# Patient Record
Sex: FEMALE | Race: BLACK OR AFRICAN AMERICAN | NOT HISPANIC OR LATINO | URBAN - METROPOLITAN AREA
[De-identification: names, ages, dates, MRNs, and addresses within clinical notes are randomized per-mention and may not be internally consistent; named-entity substitution may affect disease eponyms.]

---

## 2022-01-01 ENCOUNTER — INPATIENT (INPATIENT)
Facility: HOSPITAL | Age: 62
LOS: 0 days | End: 2022-11-01
Attending: INTERNAL MEDICINE | Admitting: INTERNAL MEDICINE

## 2022-01-01 VITALS
TEMPERATURE: 98 F | OXYGEN SATURATION: 95 % | RESPIRATION RATE: 20 BRPM | DIASTOLIC BLOOD PRESSURE: 69 MMHG | HEART RATE: 115 BPM | SYSTOLIC BLOOD PRESSURE: 95 MMHG

## 2022-01-01 VITALS
TEMPERATURE: 98 F | HEART RATE: 137 BPM | RESPIRATION RATE: 24 BRPM | SYSTOLIC BLOOD PRESSURE: 73 MMHG | WEIGHT: 119.93 LBS | DIASTOLIC BLOOD PRESSURE: 44 MMHG

## 2022-01-01 DIAGNOSIS — R00.0 TACHYCARDIA, UNSPECIFIED: ICD-10-CM

## 2022-01-01 DIAGNOSIS — R52 PAIN, UNSPECIFIED: ICD-10-CM

## 2022-01-01 DIAGNOSIS — Z51.5 ENCOUNTER FOR PALLIATIVE CARE: ICD-10-CM

## 2022-01-01 DIAGNOSIS — E43 UNSPECIFIED SEVERE PROTEIN-CALORIE MALNUTRITION: ICD-10-CM

## 2022-01-01 DIAGNOSIS — C34.90 MALIGNANT NEOPLASM OF UNSPECIFIED PART OF UNSPECIFIED BRONCHUS OR LUNG: ICD-10-CM

## 2022-01-01 LAB
HCV AB S/CO SERPL IA: 12.77 S/CO — HIGH (ref 0–0.99)
HCV AB SERPL-IMP: REACTIVE
HCV RNA FLD QL NAA+PROBE: SIGNIFICANT CHANGE UP
HCV RNA SPEC QL PROBE+SIG AMP: DETECTED

## 2022-01-01 PROCEDURE — 99223 1ST HOSP IP/OBS HIGH 75: CPT

## 2022-01-01 PROCEDURE — 99284 EMERGENCY DEPT VISIT MOD MDM: CPT

## 2022-01-01 PROCEDURE — 99238 HOSP IP/OBS DSCHRG MGMT 30/<: CPT

## 2022-01-01 PROCEDURE — 99358 PROLONG SERVICE W/O CONTACT: CPT

## 2022-01-01 RX ORDER — HYDROMORPHONE HYDROCHLORIDE 2 MG/ML
1 INJECTION INTRAMUSCULAR; INTRAVENOUS; SUBCUTANEOUS
Refills: 0 | Status: DISCONTINUED | OUTPATIENT
Start: 2022-01-01 | End: 2022-01-01

## 2022-01-01 RX ORDER — SODIUM CHLORIDE 9 MG/ML
1000 INJECTION, SOLUTION INTRAVENOUS
Refills: 0 | Status: DISCONTINUED | OUTPATIENT
Start: 2022-01-01 | End: 2022-01-01

## 2022-01-01 RX ORDER — HYDROMORPHONE HYDROCHLORIDE 2 MG/ML
2 INJECTION INTRAMUSCULAR; INTRAVENOUS; SUBCUTANEOUS ONCE
Refills: 0 | Status: DISCONTINUED | OUTPATIENT
Start: 2022-01-01 | End: 2022-01-01

## 2022-01-01 RX ORDER — ACETAMINOPHEN 500 MG
650 TABLET ORAL EVERY 8 HOURS
Refills: 0 | Status: DISCONTINUED | OUTPATIENT
Start: 2022-01-01 | End: 2022-01-01

## 2022-01-01 RX ORDER — ONDANSETRON 8 MG/1
4 TABLET, FILM COATED ORAL ONCE
Refills: 0 | Status: COMPLETED | OUTPATIENT
Start: 2022-01-01 | End: 2022-01-01

## 2022-01-01 RX ORDER — HYDROMORPHONE HYDROCHLORIDE 2 MG/ML
1.5 INJECTION INTRAMUSCULAR; INTRAVENOUS; SUBCUTANEOUS
Refills: 0 | Status: DISCONTINUED | OUTPATIENT
Start: 2022-01-01 | End: 2022-01-01

## 2022-01-01 RX ORDER — ROBINUL 0.2 MG/ML
0.4 INJECTION INTRAMUSCULAR; INTRAVENOUS EVERY 6 HOURS
Refills: 0 | Status: DISCONTINUED | OUTPATIENT
Start: 2022-01-01 | End: 2022-01-01

## 2022-01-01 RX ORDER — HYDROMORPHONE HYDROCHLORIDE 2 MG/ML
3.5 INJECTION INTRAMUSCULAR; INTRAVENOUS; SUBCUTANEOUS
Qty: 100 | Refills: 0 | Status: DISCONTINUED | OUTPATIENT
Start: 2022-01-01 | End: 2022-01-01

## 2022-01-01 RX ORDER — OXYCODONE HYDROCHLORIDE 5 MG/1
10 TABLET ORAL EVERY 8 HOURS
Refills: 0 | Status: DISCONTINUED | OUTPATIENT
Start: 2022-01-01 | End: 2022-01-01

## 2022-01-01 RX ORDER — SENNA PLUS 8.6 MG/1
2 TABLET ORAL AT BEDTIME
Refills: 0 | Status: DISCONTINUED | OUTPATIENT
Start: 2022-01-01 | End: 2022-01-01

## 2022-01-01 RX ORDER — LANOLIN ALCOHOL/MO/W.PET/CERES
3 CREAM (GRAM) TOPICAL AT BEDTIME
Refills: 0 | Status: DISCONTINUED | OUTPATIENT
Start: 2022-01-01 | End: 2022-01-01

## 2022-01-01 RX ORDER — HYDROMORPHONE HYDROCHLORIDE 2 MG/ML
5 INJECTION INTRAMUSCULAR; INTRAVENOUS; SUBCUTANEOUS
Refills: 0 | Status: DISCONTINUED | OUTPATIENT
Start: 2022-01-01 | End: 2022-01-01

## 2022-01-01 RX ADMIN — HYDROMORPHONE HYDROCHLORIDE 2 MILLIGRAM(S): 2 INJECTION INTRAMUSCULAR; INTRAVENOUS; SUBCUTANEOUS at 14:21

## 2022-01-01 RX ADMIN — SENNA PLUS 2 TABLET(S): 8.6 TABLET ORAL at 22:07

## 2022-01-01 RX ADMIN — SODIUM CHLORIDE 75 MILLILITER(S): 9 INJECTION, SOLUTION INTRAVENOUS at 06:49

## 2022-01-01 RX ADMIN — ONDANSETRON 4 MILLIGRAM(S): 8 TABLET, FILM COATED ORAL at 03:51

## 2022-01-01 RX ADMIN — SODIUM CHLORIDE 75 MILLILITER(S): 9 INJECTION, SOLUTION INTRAVENOUS at 18:08

## 2022-01-01 RX ADMIN — Medication 650 MILLIGRAM(S): at 22:07

## 2022-01-01 RX ADMIN — HYDROMORPHONE HYDROCHLORIDE 3.5 MG/HR: 2 INJECTION INTRAMUSCULAR; INTRAVENOUS; SUBCUTANEOUS at 17:24

## 2022-10-31 NOTE — H&P ADULT - PROBLEM SELECTOR PLAN 2
metastatic lung cancer on home hospice, come in with intractable pain metastatic lung cancer on home hospice, come in with intractable pain  Palliative consulted  IV dilaudid drip 3.5mg/hr, IV dilaudid 5mg q1hr prn IV push for severe pain per palliative team recommendation

## 2022-10-31 NOTE — H&P ADULT - ASSESSMENT
62 years old female with h/o metastatic lung cancer on home hospice present to ED with complain of generalized weakness and generalized body pain, no controlled with IV morphine drip at home  Tachycardic to 120-130s in ED. Reported severe pain, mild improvement with IV dilaudid 2mg.

## 2022-10-31 NOTE — ED ADULT NURSE REASSESSMENT NOTE - NS ED NURSE REASSESS COMMENT FT1
Family enquired about giving more pain medications and asking to use morphine pump. Admitting Dr notified, Dilaudid dose changed see new orders and family to hold morphine pump. Family enquired about giving more pain medications and asking to use morphine pump. Admitting Dr notified, Dilaudid dose changed see new orders and family to hold morphine pump. On assessment, patient noted with wound to coccyx, redness and excoriation to area. Small dark area to bony prominence in mid area of back. Patient repositioned for pressure relieve and comfort.

## 2022-10-31 NOTE — PHARMACOTHERAPY INTERVENTION NOTE - COMMENTS
MD called and wants to give dilaudid 5mg IV q 1h prn severe pain. MD consulted with pallative care and wants to continue order.

## 2022-10-31 NOTE — H&P ADULT - NSHPPHYSICALEXAM_GEN_ALL_CORE
CONSTITUTIONAL: Well developed, thin appearing, severe distress due to pain  EYES: PERRL, no scleral icterus  ENT: Mucosa moist, tongue normal.   NECK: Neck supple, trachea midline, non-tender  CARDIAC: Normal S1 and S2. Regular rate and rhythms. No murmurs. No Pedal edema. Peripheral pulses intact  LUNGS: Equal air entry both lungs. No rales, rhonchi, wheezing. Normal respiratory effort.   ABDOMEN: Tenderness +.  No hepatomegaly or splenomegaly. Bowel sound normal.   MUSCULOSKELETAL: Normocephalic, atraumatic. No significant deformity or joint abnormality  NEUROLOGICAL: No gross motor or sensory deficits  SKIN: no lesions or eruptions. Normal turgor  PSYCHIATRIC: A&O x 3, appropriate mood and affect

## 2022-10-31 NOTE — H&P ADULT - HISTORY OF PRESENT ILLNESS
62 years old female with h/o metastatic lung cancer on home hospice present to ED with complain of generalized weakness and generalized body pain, no controlled with IV morphine drip at home  Tachycardic to 120-130s in ED. Reported severe pain, mild improvement with IV dilaudid 2mg.     SH: patient too weak to answer  FH: patient too weak to answer

## 2022-10-31 NOTE — ED ADULT TRIAGE NOTE - CHIEF COMPLAINT QUOTE
Brought in by ambulance for generalized body pain and decreased appetite x 3 days. As per EMS, patient has a DNR, has stage 4 lung cancer, metastasized. EMS reported on patient was on morphine drip at home, has a midline on right arm. Patient is nonverbal in triage.

## 2022-10-31 NOTE — H&P ADULT - NSHPADDITIONALINFOADULT_GEN_ALL_CORE
DNR/DNI, comfort care only. Do not want labs. Ok with IV fluid  Inpatient hospice vs LTC with hospice as patient has 90 years old mother who cannot take care of her

## 2022-10-31 NOTE — H&P ADULT - PROBLEM SELECTOR PLAN 3
Likely due to pain  Pain control  Palliative consulted Likely due to pain  Pain control  Palliative consulted  Gentle IV hydration

## 2022-10-31 NOTE — H&P ADULT - PROBLEM SELECTOR PLAN 1
present with intractable pain present with intractable pain  Called [facility] hospice( [phone]). Talked with Dr Meier. Patient is on PCA dilaudid 2.5mg/hr basal and 4mg q1hr prn bolus, oxycodone 10mg prn, methadone 10mg q4hr prn  Discussed with Palliative team Dr Sullivan, unsure if patient is able to tolerate oral. Recommended to start IV dilaudid drip 3.5mg/hr and Dilaudid 5mg q1hr IV prn for breakthrough pain  Gentle IV D5 NS

## 2022-10-31 NOTE — ED PROVIDER NOTE - OBJECTIVE STATEMENT
62F hx of lung cancer, metastatic, beyond treatment pw pain. pt is comfort measures only. accompanied by brother but being cared for at home by elderly mother.

## 2022-10-31 NOTE — ED PROVIDER NOTE - PHYSICAL EXAMINATION
gen - frail, ill appearing, looks uncomfortable  skin - intact  cv - tachycardia  resp - tachypnea  gi - soft, nt  msk - cachectic  eyes - eomi  neuro - awake, appears to answer some yes or no questions, oriented to self.   id - afebrile

## 2022-11-01 NOTE — CONSULT NOTE ADULT - CONVERSATION DETAILS
Spoke with patient's brother Hair who expressed that he is primary decisionmaker, also has son who is involved in her care and aware of patient's condition. Patient was recently at [facility] inpatient hospice unit and dc'd to home hospice w [facility] on dilaudid PCA and other pain medications, however his elderly mother unable to care for patient. Patient has been declining, with increasing pain, not eating, becoming weaker. Their main goal is patient's comfort, pain control. Advised patient appears to be actively dying, prognosis could be anytime, but likely hours to days. He verbalized understanding. Advised patient is appropriate for inpatient hospice for management of symptoms, he is agreeable for referral.  services offered and accepted. Support provided. SW referral made.    Copy of [facility] on file for DNR/DNI, comfort measures.

## 2022-11-01 NOTE — ED ADULT NURSE REASSESSMENT NOTE - NS ED NURSE REASSESS COMMENT FT1
House provider paged and responded NP Jim called unit, provider informed that pt has passed and has no pulse pt is a DNR

## 2022-11-01 NOTE — ED ADULT NURSE REASSESSMENT NOTE - COMFORT CARE
po fluids offered/repositioned/warm blanket provided
meal provided/repositioned/warm blanket provided
plan of care explained/warm blanket provided
po fluids offered/repositioned

## 2022-11-01 NOTE — CONSULT NOTE ADULT - SUBJECTIVE AND OBJECTIVE BOX
Consult to: Discuss complex medical decision making related to goals of care       HPI:  62 years old female with h/o metastatic lung cancer on home hospice present to ED with complain of generalized weakness and generalized body pain, no controlled with IV morphine drip at home  Tachycardic to 120-130s in ED. Reported severe pain, mild improvement with IV dilaudid 2mg.     SH: patient too weak to answer  FH: patient too weak to answer (31 Oct 2022 16:19)    Interval history: pt min responsive, breathing mildly labored, appears to be actively dying. DNR/DNI in place.       PAST MEDICAL & SURGICAL HISTORY:      FAMILY HISTORY:   unable to obtain from patient due to poor mentation, family unable to give information, see H&P for history      SOCIAL HISTORY: has 1 son, siblings  [ ]  Uatsdin/Spirituality: Congregational  Substance hx:  [x ]   Tobacco hx:  [x ]   Alcohol hx: [ x]   Home Opioid hx:  dilaudid PCA, oxycodone IR, methadone oral solution, doses as below   I-Stop Reference No: [id number]  Living Situation: [ x]Home  [ ]Long term care  [ ]Rehab [ ]Other    Baseline ADLs (prior to admission): awake, nonambulatory    Review of systems:        Unable to obtain/Limited due to poor mental status    Allergies    No Known Allergies    Intolerances           MEDICATIONS  (STANDING):  acetaminophen     Tablet .. 650 milliGRAM(s) Oral every 8 hours  dextrose 5% + sodium chloride 0.9%. 1000 milliLiter(s) (75 mL/Hr) IV Continuous <Continuous>  HYDROmorphone Infusion 3.5 mG/Hr (3.5 mL/Hr) IV Continuous <Continuous>  senna 2 Tablet(s) Oral at bedtime    MEDICATIONS  (PRN):  aluminum hydroxide/magnesium hydroxide/simethicone Suspension 30 milliLiter(s) Oral every 4 hours PRN Dyspepsia  glycopyrrolate Injectable 0.4 milliGRAM(s) IV Push every 6 hours PRN secretions  HYDROmorphone  Injectable 5 milliGRAM(s) IV Push every 1 hour PRN Severe Pain (7 - 10)  melatonin 3 milliGRAM(s) Oral at bedtime PRN Insomnia  oxyCODONE    IR 10 milliGRAM(s) Oral every 8 hours PRN Moderate Pain (4 - 6)      PHYSICAL EXAM:  Vital Signs Last 24 Hrs  T(C): 36.4 (01 Nov 2022 04:01), Max: 36.8 (31 Oct 2022 20:01)  T(F): 97.6 (01 Nov 2022 04:01), Max: 98.3 (31 Oct 2022 20:01)  HR: 115 (01 Nov 2022 04:01) (115 - 137)  BP: 95/69 (01 Nov 2022 04:01) (73/44 - 95/69)  BP(mean): 70 (01 Nov 2022 02:06) (70 - 70)  RR: 20 (01 Nov 2022 04:01) (19 - 31)  SpO2: 95% (01 Nov 2022 04:01) (94% - 97%)    Parameters below as of 01 Nov 2022 04:01  Patient On (Oxygen Delivery Method): nasal cannula  O2 Flow (L/min): 2       Palliative Performance Scale/Karnofsky Score: 10  ECOG Performance:4    GENERAL: lethargic, ill appearing, on NC,  NAD  HEENT: Atraumatic, oropharynx clear, neck supple  CHEST/LUNG: mildly labored  HEART: Regular rate and rhythm    ABDOMEN: Soft, Nontender, Nondistended   MUSCULOSKELETAL:  No  edema,  bedbound  NERVOUS SYSTEM: lethargic, not following commands  SKIN: No rashes or lesions noted  Oral intake: npo     LABS:              RADIOLOGY & ADDITIONAL STUDIES:

## 2022-11-01 NOTE — ED ADULT NURSE NOTE - ED STAT RN HANDOFF DETAILS
Report given to ROBEL Nathan,pts history, current condition and reason for admission discussed, safety concerns addressed and reviewed, pt currently in stable condition, IV flushes for patency and site shows no signs or symptoms of infiltrate, dressing is clean dry and intact, pt is aware of plan of care. Pt education deemed successful at time of report after patient demonstrates successful teach back for proficiency.

## 2022-11-01 NOTE — CONSULT NOTE ADULT - PROBLEM SELECTOR RECOMMENDATION 2
on home hospice w [facility], recent admission to  inpatient hospice unit. Main goal is comfort, patient currently actively dying, family agreeable for IPU. DNR/DNI    glycopyrrolate prn secretions

## 2022-11-01 NOTE — CONSULT NOTE ADULT - PROBLEM SELECTOR RECOMMENDATION 3
Clinical evidence indicates that the patient has Severe protein calorie malnutrition/ 3rd degree    In context of   Chronic Illness (>1 month)    Energy/Food intake <50% of estimated energy requirement >5 days  Weight loss: Moderate - severe (lbs lost recently)  Body Fat loss: Severe   (Cachexia, temporal wasting, muscle atrophy)       Strength: weakened severe (bedbound)    Recommend:   pleasure feeds as tolerated - currently npo given poor mental status. Oral care only

## 2022-11-01 NOTE — CONSULT NOTE ADULT - PROBLEM SELECTOR RECOMMENDATION 4
Copy of [facility] on file for DNR/DNI, comfort measures. Pt actively dying, prognosis hours to days. Family in agreement for IPU for management of symptoms. SW referral made.

## 2022-11-01 NOTE — CONSULT NOTE ADULT - PROBLEM SELECTOR RECOMMENDATION 9
on dilaudid PCA 2.5 mg/hr with 4mg q1h prn for breakthrough, oxycodone IR prn and methadone 10mg q4h prn breakthrough pain- confirmed w VNS, ISTOP reviewed.   D/w primary team on admission,  started on dilaudid infusion @3.5 mg /hr w 5 mg IVP q1h prn for breakthrough pain or dyspnea

## 2022-11-01 NOTE — DISCHARGE NOTE FOR THE EXPIRED PATIENT - HOSPITAL COURSE
62 years old female with h/o metastatic lung cancer on home hospice present to ED with complain of generalized weakness and generalized body pain, no controlled with IV morphine drip at home  Called [facility] hospice( [phone]). Talked with Dr Meier. Patient is on PCA dilaudid 2.5mg/hr basal and 4mg q1hr prn bolus, oxycodone 10mg prn, methadone 10mg q4hr prn  Discussed with Palliative team Dr Sullivan, unsure if patient is able to tolerate oral. Recommended to start IV dilaudid drip 3.5mg/hr and Dilaudid 5mg q1hr IV prn for breakthrough pain    ·  Problem: Stage 4 lung cancer.   ·  Plan: metastatic lung cancer on home hospice, come in with intractable pain  Palliative consulted  IV dilaudid drip 3.5mg/hr, IV dilaudid 5mg q1hr prn IV push for severe pain per palliative team recommendation.    Additional Information: DNR/DNI, comfort care only. Do not want labs. Ok with IV fluid  Inpatient hospice vs LTC with hospice as patient has 90 years old mother who cannot take care of her    Was asked by RN to pronounce pt. Pt seen and examined at bedside. Absent heart sounds, absent breath sounds. No pupillary reflex noted. Absent radial, carotid and femoral pulses. Pt pronounced  at 11:18am. Daughter Myrtle Mendoza notified at bedside. Dr. Rowan notified.  62 years old female with h/o metastatic lung cancer on home hospice present to ED with complain of generalized weakness and generalized body pain, no controlled with IV morphine drip at home  Called [facility] hospice( [phone]). Talked with Dr Meier. Patient is on PCA dilaudid 2.5mg/hr basal and 4mg q1hr prn bolus, oxycodone 10mg prn, methadone 10mg q4hr prn  Discussed with Palliative team Dr Sullivan, unsure if patient is able to tolerate oral. Recommended to start IV dilaudid drip 3.5mg/hr and Dilaudid 5mg q1hr IV prn for breakthrough pain    ·  Problem: Stage 4 lung cancer.   ·  Plan: metastatic lung cancer on home hospice, come in with intractable pain  Palliative consulted  IV dilaudid drip 3.5mg/hr, IV dilaudid 5mg q1hr prn IV push for severe pain per palliative team recommendation.    Additional Information: DNR/DNI, comfort care only. Do not want labs. Ok with IV fluid  Inpatient hospice vs LTC with hospice as patient has 90 years old mother who cannot take care of her    Was asked by RN to pronounce pt. Pt seen and examined at bedside. Absent heart sounds, absent breath sounds. No pupillary reflex noted. Absent radial, carotid and femoral pulses. Pt pronounced  at 11:18am. Sister, Myrtle Mendoza notified at bedside. Dr. Rowan notified.

## 2022-11-07 DIAGNOSIS — C34.90 MALIGNANT NEOPLASM OF UNSPECIFIED PART OF UNSPECIFIED BRONCHUS OR LUNG: ICD-10-CM

## 2022-11-07 DIAGNOSIS — G89.3 NEOPLASM RELATED PAIN (ACUTE) (CHRONIC): ICD-10-CM

## 2022-11-07 DIAGNOSIS — Z66 DO NOT RESUSCITATE: ICD-10-CM

## 2022-11-07 DIAGNOSIS — Z87.891 PERSONAL HISTORY OF NICOTINE DEPENDENCE: ICD-10-CM

## 2022-11-07 DIAGNOSIS — R64 CACHEXIA: ICD-10-CM

## 2022-11-07 DIAGNOSIS — R00.0 TACHYCARDIA, UNSPECIFIED: ICD-10-CM

## 2022-11-07 DIAGNOSIS — E43 UNSPECIFIED SEVERE PROTEIN-CALORIE MALNUTRITION: ICD-10-CM

## 2022-11-07 DIAGNOSIS — C79.51 SECONDARY MALIGNANT NEOPLASM OF BONE: ICD-10-CM

## 2022-11-07 DIAGNOSIS — Z51.5 ENCOUNTER FOR PALLIATIVE CARE: ICD-10-CM
